# Patient Record
Sex: MALE | Race: OTHER | NOT HISPANIC OR LATINO | URBAN - METROPOLITAN AREA
[De-identification: names, ages, dates, MRNs, and addresses within clinical notes are randomized per-mention and may not be internally consistent; named-entity substitution may affect disease eponyms.]

---

## 2021-04-26 ENCOUNTER — EMERGENCY (EMERGENCY)
Facility: HOSPITAL | Age: 61
LOS: 1 days | Discharge: LEFT BEFORE DISCHARGE | End: 2021-04-26
Attending: EMERGENCY MEDICINE | Admitting: EMERGENCY MEDICINE
Payer: COMMERCIAL

## 2021-04-26 VITALS
HEART RATE: 90 BPM | TEMPERATURE: 98 F | SYSTOLIC BLOOD PRESSURE: 197 MMHG | RESPIRATION RATE: 16 BRPM | DIASTOLIC BLOOD PRESSURE: 119 MMHG | OXYGEN SATURATION: 100 %

## 2021-04-26 VITALS
HEART RATE: 86 BPM | DIASTOLIC BLOOD PRESSURE: 93 MMHG | RESPIRATION RATE: 18 BRPM | OXYGEN SATURATION: 100 % | SYSTOLIC BLOOD PRESSURE: 173 MMHG

## 2021-04-26 LAB
ALBUMIN SERPL ELPH-MCNC: 4.3 G/DL — SIGNIFICANT CHANGE UP (ref 3.3–5)
ALP SERPL-CCNC: 65 U/L — SIGNIFICANT CHANGE UP (ref 40–120)
ALT FLD-CCNC: 50 U/L — HIGH (ref 4–41)
ANION GAP SERPL CALC-SCNC: 11 MMOL/L — SIGNIFICANT CHANGE UP (ref 7–14)
AST SERPL-CCNC: 38 U/L — SIGNIFICANT CHANGE UP (ref 4–40)
BASOPHILS # BLD AUTO: 0.06 K/UL — SIGNIFICANT CHANGE UP (ref 0–0.2)
BASOPHILS NFR BLD AUTO: 0.6 % — SIGNIFICANT CHANGE UP (ref 0–2)
BILIRUB SERPL-MCNC: 0.5 MG/DL — SIGNIFICANT CHANGE UP (ref 0.2–1.2)
BLOOD GAS VENOUS COMPREHENSIVE RESULT: SIGNIFICANT CHANGE UP
BUN SERPL-MCNC: 14 MG/DL — SIGNIFICANT CHANGE UP (ref 7–23)
CALCIUM SERPL-MCNC: 9.6 MG/DL — SIGNIFICANT CHANGE UP (ref 8.4–10.5)
CHLORIDE SERPL-SCNC: 101 MMOL/L — SIGNIFICANT CHANGE UP (ref 98–107)
CO2 SERPL-SCNC: 24 MMOL/L — SIGNIFICANT CHANGE UP (ref 22–31)
CREAT SERPL-MCNC: 0.79 MG/DL — SIGNIFICANT CHANGE UP (ref 0.5–1.3)
EOSINOPHIL # BLD AUTO: 0.31 K/UL — SIGNIFICANT CHANGE UP (ref 0–0.5)
EOSINOPHIL NFR BLD AUTO: 3 % — SIGNIFICANT CHANGE UP (ref 0–6)
GLUCOSE SERPL-MCNC: 251 MG/DL — HIGH (ref 70–99)
HCT VFR BLD CALC: 44.6 % — SIGNIFICANT CHANGE UP (ref 39–50)
HGB BLD-MCNC: 14.9 G/DL — SIGNIFICANT CHANGE UP (ref 13–17)
IANC: 6.79 K/UL — SIGNIFICANT CHANGE UP (ref 1.5–8.5)
IMM GRANULOCYTES NFR BLD AUTO: 0.5 % — SIGNIFICANT CHANGE UP (ref 0–1.5)
LYMPHOCYTES # BLD AUTO: 2.35 K/UL — SIGNIFICANT CHANGE UP (ref 1–3.3)
LYMPHOCYTES # BLD AUTO: 23 % — SIGNIFICANT CHANGE UP (ref 13–44)
MAGNESIUM SERPL-MCNC: 2 MG/DL — SIGNIFICANT CHANGE UP (ref 1.6–2.6)
MCHC RBC-ENTMCNC: 29.9 PG — SIGNIFICANT CHANGE UP (ref 27–34)
MCHC RBC-ENTMCNC: 33.4 GM/DL — SIGNIFICANT CHANGE UP (ref 32–36)
MCV RBC AUTO: 89.6 FL — SIGNIFICANT CHANGE UP (ref 80–100)
MONOCYTES # BLD AUTO: 0.64 K/UL — SIGNIFICANT CHANGE UP (ref 0–0.9)
MONOCYTES NFR BLD AUTO: 6.3 % — SIGNIFICANT CHANGE UP (ref 2–14)
NEUTROPHILS # BLD AUTO: 6.79 K/UL — SIGNIFICANT CHANGE UP (ref 1.8–7.4)
NEUTROPHILS NFR BLD AUTO: 66.6 % — SIGNIFICANT CHANGE UP (ref 43–77)
NRBC # BLD: 0 /100 WBCS — SIGNIFICANT CHANGE UP
NRBC # FLD: 0 K/UL — SIGNIFICANT CHANGE UP
PLATELET # BLD AUTO: 245 K/UL — SIGNIFICANT CHANGE UP (ref 150–400)
POTASSIUM SERPL-MCNC: 4 MMOL/L — SIGNIFICANT CHANGE UP (ref 3.5–5.3)
POTASSIUM SERPL-SCNC: 4 MMOL/L — SIGNIFICANT CHANGE UP (ref 3.5–5.3)
PROT SERPL-MCNC: 7.9 G/DL — SIGNIFICANT CHANGE UP (ref 6–8.3)
RBC # BLD: 4.98 M/UL — SIGNIFICANT CHANGE UP (ref 4.2–5.8)
RBC # FLD: 12.2 % — SIGNIFICANT CHANGE UP (ref 10.3–14.5)
SODIUM SERPL-SCNC: 136 MMOL/L — SIGNIFICANT CHANGE UP (ref 135–145)
TROPONIN T, HIGH SENSITIVITY RESULT: 22 NG/L — SIGNIFICANT CHANGE UP
WBC # BLD: 10.2 K/UL — SIGNIFICANT CHANGE UP (ref 3.8–10.5)
WBC # FLD AUTO: 10.2 K/UL — SIGNIFICANT CHANGE UP (ref 3.8–10.5)

## 2021-04-26 PROCEDURE — 71046 X-RAY EXAM CHEST 2 VIEWS: CPT | Mod: 26

## 2021-04-26 PROCEDURE — 99285 EMERGENCY DEPT VISIT HI MDM: CPT

## 2021-04-26 RX ORDER — FAMOTIDINE 10 MG/ML
20 INJECTION INTRAVENOUS ONCE
Refills: 0 | Status: COMPLETED | OUTPATIENT
Start: 2021-04-26 | End: 2021-04-26

## 2021-04-26 RX ORDER — ASPIRIN/CALCIUM CARB/MAGNESIUM 324 MG
162 TABLET ORAL ONCE
Refills: 0 | Status: COMPLETED | OUTPATIENT
Start: 2021-04-26 | End: 2021-04-26

## 2021-04-26 RX ADMIN — Medication 30 MILLILITER(S): at 22:58

## 2021-04-26 RX ADMIN — FAMOTIDINE 20 MILLIGRAM(S): 10 INJECTION INTRAVENOUS at 22:58

## 2021-04-26 RX ADMIN — Medication 162 MILLIGRAM(S): at 22:58

## 2021-04-26 NOTE — ED ADULT TRIAGE NOTE - CHIEF COMPLAINT QUOTE
Patient c/o feeling palpitations and belching today at work and " mild left sided chest " discomfort". Patient has a h/o DM, CABG . hypertensive in triage, no h/o HTN.  FS = [id number]

## 2021-04-26 NOTE — ED PROVIDER NOTE - NS ED ROS FT
CONSTITUTIONAL: No fevers, no chills, no lightheadedness, no dizziness  EYES: no visual changes, no eye pain  EARS: no ear drainage, no ear pain, no change in hearing  NOSE: no nasal congestion  MOUTH/THROAT: no sore throat  CV: No chest pain  RESP: No SOB, no cough  GI: No n/v/d, no abd pain  : no dysuria, no hematuria, no flank pain  MSK: no back pain, no extremity pain  SKIN: no rashes  NEURO: no headache, no focal weakness, no decreased sensation/paresthesias

## 2021-04-26 NOTE — ED PROVIDER NOTE - OBJECTIVE STATEMENT
62yo M hx htn dm CABG in distant past, p/w intermittent episodes of palpitations/"heart beating" w/ belching that last 30 mins and come back 2 hours later, x 1 day. No hx of similar episodes. Denies exertional sx, cp, sob, abd pain, n/v/d. Admits has not had fu with cards or provocative testing in a long time.

## 2021-04-26 NOTE — ED PROVIDER NOTE - PATIENT PORTAL LINK FT
You can access the FollowMyHealth Patient Portal offered by [facility] by registering at the following website: [email or web address]. By joining [employer]’s FollowMyHealth portal, you will also be able to view your health information using other applications (apps) compatible with our system.

## 2021-04-26 NOTE — ED ADULT NURSE NOTE - OBJECTIVE STATEMENT
kang RN received pt to room 4 aox3 in no apparent distress VSS c/o palpitations and belching since 10 am. Pt states episodes are intermittent blasting 30 m in. Denies any aggravating or alleviating factors. Pt denies chest pain, weakness, SOB, n/v. Pt hx CABG states this does not feels similar to then and denies any symptoms at all at this time. Noted with glucose monitor to L arm. 18 G PIV R AC placed, NSR on cardiac monitor,  endorsed to primary RN

## 2021-04-26 NOTE — ED PROVIDER NOTE - NSFOLLOWUPINSTRUCTIONS_ED_ALL_ED_FT
Follow up with your cardiologist in 2-3 days for STRESS test and holter monitor, or call [phone] and arrange a follow up with our clinic, state you were seen in the emergency department and would like a rapid appointment.  - Return to the Emergency Room for any new or worsening symptoms.  - Please take Tylenol 650mg every 6 hours as needed for pain.

## 2021-04-26 NOTE — ED PROVIDER NOTE - CLINICAL SUMMARY MEDICAL DECISION MAKING FREE TEXT BOX
Intermittent palpitations/belching concern of acs vs arrhythmia given poor fu and significant cardiac hx. Labs, ekg, and likely CDU v admit.

## 2021-04-26 NOTE — ED PROVIDER NOTE - PROGRESS NOTE DETAILS
Dr Hanson: Pt trop delta negative, pt refusing admission for tele monitoring, says he will see his doctor today or tomorrow. 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

## 2021-04-27 LAB
SARS-COV-2 RNA SPEC QL NAA+PROBE: SIGNIFICANT CHANGE UP
TROPONIN T, HIGH SENSITIVITY RESULT: 23 NG/L — SIGNIFICANT CHANGE UP

## 2021-10-15 ENCOUNTER — EMERGENCY (EMERGENCY)
Facility: HOSPITAL | Age: 61
LOS: 1 days | Discharge: HOME | End: 2021-10-15
Attending: EMERGENCY MEDICINE | Admitting: HOSPITALIST
Payer: COMMERCIAL

## 2021-10-15 VITALS
HEART RATE: 69 BPM | RESPIRATION RATE: 16 BRPM | TEMPERATURE: 98 F | DIASTOLIC BLOOD PRESSURE: 91 MMHG | OXYGEN SATURATION: 99 % | SYSTOLIC BLOOD PRESSURE: 172 MMHG

## 2021-10-15 VITALS
RESPIRATION RATE: 16 BRPM | SYSTOLIC BLOOD PRESSURE: 135 MMHG | HEART RATE: 69 BPM | TEMPERATURE: 98 F | OXYGEN SATURATION: 100 % | DIASTOLIC BLOOD PRESSURE: 92 MMHG

## 2021-10-15 LAB
ALBUMIN SERPL ELPH-MCNC: 4.6 G/DL — SIGNIFICANT CHANGE UP (ref 3.3–5)
ALP SERPL-CCNC: 76 U/L — SIGNIFICANT CHANGE UP (ref 40–120)
ALT FLD-CCNC: 52 U/L — HIGH (ref 4–41)
ANION GAP SERPL CALC-SCNC: 11 MMOL/L — SIGNIFICANT CHANGE UP (ref 7–14)
APTT BLD: 31.9 SEC — SIGNIFICANT CHANGE UP (ref 27–36.3)
AST SERPL-CCNC: 29 U/L — SIGNIFICANT CHANGE UP (ref 4–40)
B PERT DNA SPEC QL NAA+PROBE: SIGNIFICANT CHANGE UP
B PERT+PARAPERT DNA PNL SPEC NAA+PROBE: SIGNIFICANT CHANGE UP
BASOPHILS # BLD AUTO: 0.04 K/UL — SIGNIFICANT CHANGE UP (ref 0–0.2)
BASOPHILS NFR BLD AUTO: 0.4 % — SIGNIFICANT CHANGE UP (ref 0–2)
BILIRUB SERPL-MCNC: 0.6 MG/DL — SIGNIFICANT CHANGE UP (ref 0.2–1.2)
BORDETELLA PARAPERTUSSIS (RAPRVP): SIGNIFICANT CHANGE UP
BUN SERPL-MCNC: 11 MG/DL — SIGNIFICANT CHANGE UP (ref 7–23)
C PNEUM DNA SPEC QL NAA+PROBE: SIGNIFICANT CHANGE UP
CALCIUM SERPL-MCNC: 9.8 MG/DL — SIGNIFICANT CHANGE UP (ref 8.4–10.5)
CHLORIDE SERPL-SCNC: 100 MMOL/L — SIGNIFICANT CHANGE UP (ref 98–107)
CO2 SERPL-SCNC: 25 MMOL/L — SIGNIFICANT CHANGE UP (ref 22–31)
CREAT SERPL-MCNC: 0.8 MG/DL — SIGNIFICANT CHANGE UP (ref 0.5–1.3)
EOSINOPHIL # BLD AUTO: 0.18 K/UL — SIGNIFICANT CHANGE UP (ref 0–0.5)
EOSINOPHIL NFR BLD AUTO: 1.7 % — SIGNIFICANT CHANGE UP (ref 0–6)
FLUAV SUBTYP SPEC NAA+PROBE: SIGNIFICANT CHANGE UP
FLUBV RNA SPEC QL NAA+PROBE: SIGNIFICANT CHANGE UP
GLUCOSE SERPL-MCNC: 193 MG/DL — HIGH (ref 70–99)
HADV DNA SPEC QL NAA+PROBE: SIGNIFICANT CHANGE UP
HCOV 229E RNA SPEC QL NAA+PROBE: SIGNIFICANT CHANGE UP
HCOV HKU1 RNA SPEC QL NAA+PROBE: SIGNIFICANT CHANGE UP
HCOV NL63 RNA SPEC QL NAA+PROBE: SIGNIFICANT CHANGE UP
HCOV OC43 RNA SPEC QL NAA+PROBE: SIGNIFICANT CHANGE UP
HCT VFR BLD CALC: 46.9 % — SIGNIFICANT CHANGE UP (ref 39–50)
HGB BLD-MCNC: 15.7 G/DL — SIGNIFICANT CHANGE UP (ref 13–17)
HMPV RNA SPEC QL NAA+PROBE: SIGNIFICANT CHANGE UP
HPIV1 RNA SPEC QL NAA+PROBE: SIGNIFICANT CHANGE UP
HPIV2 RNA SPEC QL NAA+PROBE: SIGNIFICANT CHANGE UP
HPIV3 RNA SPEC QL NAA+PROBE: SIGNIFICANT CHANGE UP
HPIV4 RNA SPEC QL NAA+PROBE: SIGNIFICANT CHANGE UP
IANC: 7.45 K/UL — SIGNIFICANT CHANGE UP (ref 1.5–8.5)
IMM GRANULOCYTES NFR BLD AUTO: 0.4 % — SIGNIFICANT CHANGE UP (ref 0–1.5)
INR BLD: 1.1 RATIO — SIGNIFICANT CHANGE UP (ref 0.88–1.16)
LYMPHOCYTES # BLD AUTO: 2.53 K/UL — SIGNIFICANT CHANGE UP (ref 1–3.3)
LYMPHOCYTES # BLD AUTO: 23.4 % — SIGNIFICANT CHANGE UP (ref 13–44)
M PNEUMO DNA SPEC QL NAA+PROBE: SIGNIFICANT CHANGE UP
MCHC RBC-ENTMCNC: 30 PG — SIGNIFICANT CHANGE UP (ref 27–34)
MCHC RBC-ENTMCNC: 33.5 GM/DL — SIGNIFICANT CHANGE UP (ref 32–36)
MCV RBC AUTO: 89.7 FL — SIGNIFICANT CHANGE UP (ref 80–100)
MONOCYTES # BLD AUTO: 0.57 K/UL — SIGNIFICANT CHANGE UP (ref 0–0.9)
MONOCYTES NFR BLD AUTO: 5.3 % — SIGNIFICANT CHANGE UP (ref 2–14)
NEUTROPHILS # BLD AUTO: 7.45 K/UL — HIGH (ref 1.8–7.4)
NEUTROPHILS NFR BLD AUTO: 68.8 % — SIGNIFICANT CHANGE UP (ref 43–77)
NRBC # BLD: 0 /100 WBCS — SIGNIFICANT CHANGE UP
NRBC # FLD: 0 K/UL — SIGNIFICANT CHANGE UP
PLATELET # BLD AUTO: 276 K/UL — SIGNIFICANT CHANGE UP (ref 150–400)
POTASSIUM SERPL-MCNC: 5.1 MMOL/L — SIGNIFICANT CHANGE UP (ref 3.5–5.3)
POTASSIUM SERPL-SCNC: 5.1 MMOL/L — SIGNIFICANT CHANGE UP (ref 3.5–5.3)
PROT SERPL-MCNC: 8.5 G/DL — HIGH (ref 6–8.3)
PROTHROM AB SERPL-ACNC: 12.6 SEC — SIGNIFICANT CHANGE UP (ref 10.6–13.6)
RAPID RVP RESULT: SIGNIFICANT CHANGE UP
RBC # BLD: 5.23 M/UL — SIGNIFICANT CHANGE UP (ref 4.2–5.8)
RBC # FLD: 11.9 % — SIGNIFICANT CHANGE UP (ref 10.3–14.5)
RSV RNA SPEC QL NAA+PROBE: SIGNIFICANT CHANGE UP
RV+EV RNA SPEC QL NAA+PROBE: SIGNIFICANT CHANGE UP
SARS-COV-2 RNA SPEC QL NAA+PROBE: SIGNIFICANT CHANGE UP
SODIUM SERPL-SCNC: 136 MMOL/L — SIGNIFICANT CHANGE UP (ref 135–145)
TROPONIN T, HIGH SENSITIVITY RESULT: 24 NG/L — SIGNIFICANT CHANGE UP
TROPONIN T, HIGH SENSITIVITY RESULT: 25 NG/L — SIGNIFICANT CHANGE UP
WBC # BLD: 10.81 K/UL — HIGH (ref 3.8–10.5)
WBC # FLD AUTO: 10.81 K/UL — HIGH (ref 3.8–10.5)

## 2021-10-15 PROCEDURE — 93010 ELECTROCARDIOGRAM REPORT: CPT

## 2021-10-15 PROCEDURE — 70450 CT HEAD/BRAIN W/O DYE: CPT | Mod: 26

## 2021-10-15 PROCEDURE — 99285 EMERGENCY DEPT VISIT HI MDM: CPT | Mod: 25

## 2021-10-15 PROCEDURE — 71045 X-RAY EXAM CHEST 1 VIEW: CPT | Mod: 26

## 2021-10-15 RX ORDER — MECLIZINE HCL 12.5 MG
25 TABLET ORAL ONCE
Refills: 0 | Status: COMPLETED | OUTPATIENT
Start: 2021-10-15 | End: 2021-10-15

## 2021-10-15 RX ADMIN — Medication 25 MILLIGRAM(S): at 16:32

## 2021-10-15 NOTE — ED PROVIDER NOTE - PATIENT PORTAL LINK FT
You can access the FollowMyHealth Patient Portal offered by [facility] by registering at the following website: [email or web address]. By joining [email or web address]’s FollowMyHealth portal, you will also be able to view your health information using other applications (apps) compatible with our system.

## 2021-10-15 NOTE — ED PROVIDER NOTE - PROGRESS NOTE DETAILS
Lalo, PGY3: pt with dizziness that's intermittent for the past week worsening over past 3 days, pt denies any association with exertion, denies any chest pain/sob, fevers/chills, uri sxs, cough, abd pain, n/v/d, dark/bloody stools, urinary sxs, orthopnea/leg swelling. Pt states that he is mostly worried because he feels the sensation of when he has a PVC. States the dizziness sensation comes at random. Pts trop stable here, ekg not ischemic, no events on monitor while here for 3 hrs, vitals reassuring. Pt states he has PCP appointment for tomorrow already set and that he has a Cardiologist appointment set up in a week. Pt states that if his labs/ekg/cxr/ct head aren't abnormal that he'd prefer to go home. Explained to pt return precautions and stressed that he should definitely follow up with his doctor appointments. Joint decision making made and pt ok for discharge.

## 2021-10-15 NOTE — ED PROVIDER NOTE - CLINICAL SUMMARY MEDICAL DECISION MAKING FREE TEXT BOX
63 y/o male with PMHx of CAD s/p CABG, HTN, and DM type 2 who presented to the ED for dizziness X 1 wk.  Concern for ACS/Vertigo/Arrhythmia/UTI/Metabolic. Less suspicious for a central neurological cause.   Labs, EKG, CXR, UA 61 y/o male with PMHx of CAD s/p CABG, HTN, and DM type 2 who presented to the ED for dizziness X 1 wk.  Concern for ACS/Vertigo/Arrhythmia/UTI/Metabolic. Less suspicious for a central neurological cause.   Labs, EKG, CXR, CT, UA

## 2021-10-15 NOTE — ED PROVIDER NOTE - OBJECTIVE STATEMENT
61 y/o male with PMHx of CAD s/p CABG, HTN, and DM type 2 who presented to the ED for dizziness X 1 wk. 63 y/o male with PMHx of CAD s/p CABG, HTN, and DM type 2 who presented to the ED for dizziness intermittent X 1 wk. Pt states over the past week having episodes of dizziness worse over the past 3 days. Pt notes symptoms worse with change in position. Pt denies any headache, fever, chills, nausea, vomiting, SOB, chest pain, or abd pain. Pt admits to episodes of palpitations.

## 2021-10-15 NOTE — ED PROVIDER NOTE - NSFOLLOWUPINSTRUCTIONS_ED_ALL_ED_FT
- Please follow up with your Primary Care Doctor tomorrow in the office. Bring your results from today.    - Please follow up with your Cardiologist at your upcoming appointment. Bring your results from today.      - Be sure to return to the ED if you develop new, worsening, or any distressing symptoms.

## 2021-10-15 NOTE — ED PROVIDER NOTE - NEUROLOGICAL, MLM
Alert and oriented, no focal deficits, no motor or sensory deficits. Good finger to nose. No nystagmus.

## 2021-10-15 NOTE — ED ADULT TRIAGE NOTE - CHIEF COMPLAINT QUOTE
Pt presents to ED for dizziness x1wk, worse last couple of days. Pt also high blood pressure of 180/100 x2days ago. Pt states "Sometimes I feel like I am going to pass out." Pt also c/o tingling to b/l legs x2-3months.  Pmhx of quadruple bypass 2004 on plavix, aspirin, HTN, DM.

## 2021-10-15 NOTE — ED PROVIDER NOTE - ATTENDING CONTRIBUTION TO CARE
Pt was seen and evaluated by me. Pt is a 63 y/o male with PMHx of CAD s/p CABG, HTN, and DM type 2 who presented to the ED for dizziness X 1 wk. Pt states over the past week having dizziness       Concern for ACS/Vertigo/Arrhythmia/UTI/Metabolic  Labs, EKG, CXR, UA Pt was seen and evaluated by me. Pt is a 63 y/o male with PMHx of CAD s/p CABG, HTN, and DM type 2 who presented to the ED for dizziness intermittent X 1 wk. Pt states over the past week having episodes of dizziness worse over the past 3 days. Pt notes symptoms worse with change in position. Pt denies any headache, fever, chills, nausea, vomiting, SOB, chest pain, or abd pain. Pt admits to episodes of palpitations. Lungs CTA b/l. RRR. Abd soft, non-tender. No focal deficits. Good finger to nose. No nystagmus. 5/5/ b/l UE and LE.   Concern for ACS/Vertigo/Arrhythmia/UTI/Metabolic  Labs, EKG, CXR, CT, UA

## 2021-10-15 NOTE — ED ADULT NURSE NOTE - OBJECTIVE STATEMENT
kang rn: pt received to room #2 with c/o dizziness x 1 week and "feeling like I'm missing a beat" pt on HM, NSR. rr even and unlabored, sounds congested when speaking, states "I woke up with my voice hoarse" denies cp, sob, n/v/d. pt aox4. IV placed, labs drawn and sent, pending MD hansen, report given to break cover CARLOS OBREGON

## 2021-11-23 VITALS
HEART RATE: 56 BPM | SYSTOLIC BLOOD PRESSURE: 138 MMHG | DIASTOLIC BLOOD PRESSURE: 68 MMHG | TEMPERATURE: 98 F | RESPIRATION RATE: 16 BRPM | OXYGEN SATURATION: 99 %

## 2021-11-23 PROBLEM — Z86.79 PERSONAL HISTORY OF OTHER DISEASES OF THE CIRCULATORY SYSTEM: Chronic | Status: ACTIVE | Noted: 2021-10-15

## 2021-11-23 RX ORDER — CHLORHEXIDINE GLUCONATE 213 G/1000ML
1 SOLUTION TOPICAL ONCE
Refills: 0 | Status: DISCONTINUED | OUTPATIENT
Start: 2021-11-30 | End: 2021-11-30

## 2021-11-23 NOTE — H&P ADULT - NSICDXPASTMEDICALHX_GEN_ALL_CORE_FT
PAST MEDICAL HISTORY:  H/O: HTN (hypertension)     Hyperlipidemia      PAST MEDICAL HISTORY:  DM (diabetes mellitus)     H/O: HTN (hypertension)     Hyperlipidemia      PAST MEDICAL HISTORY:  CAD (coronary artery disease)     DM (diabetes mellitus)     H/O: HTN (hypertension)     Hyperlipidemia

## 2021-11-23 NOTE — H&P ADULT - RS GEN PE MLT RESP DETAILS PC
normal/airway patent/breath sounds equal/good air movement/respirations non-labored/no rales/no rhonchi/no wheezes

## 2021-11-23 NOTE — H&P ADULT - HISTORY OF PRESENT ILLNESS
SKELETON    Covid:  Cardiologist: Dr. Leroy  Pharmacy:  Escort:    60 yo M with PMHx HTN, DM2, CAD s/p CABG who presented to Cardiologist Dr. Leroy c/o intermittent dizziness worsened with change in position.     Denies CP, SOB, diaphoresis, palpitations, fatigue, LE edema, orthopnea, PND, syncope, N/V, abdominal pain, f/c, sick contact, recent travel.    In light of pt's risk factors, CCS Anginal Class ___ Sx, abnormal ___, pt referred for cardiac cath with possible intervention to r/o underlying CAD.    COVID:  Pharmacy:  Escort:    61Y M w/ FHx of CAD and PMHx of HTN, HLD, CAD s/p CABG, DM-II, and CHF (EF unknown), initially presented to outpt cardiologist Dr. Leroy c/o generalized fatigue, SOB and CP. He denies any ___ palpitations,  dizziness/lightheadedness, syncope, diaphoresis, LE edema, orthopnea, PND, N/V, fever, chills or recent sick contact. Per MD note, Echo revealed no LV thrombus and Holter revealed no evidence of malignant tachy or riana arrythmias.  In light of pts risk factors, CCS class __ anginal symptoms and abnormal ____, pt now presents to [facility] for recommended cardiac catheterization with possible intervention if clinically indicated.  COVID: negative 11/27/21 (pt bringing in results)  Pharmacy: [facility] ([address] [facility])  Escort: Wife    61Y M w/ FHx of CAD and PMHx of HTN, HLD, CAD w/ prior MI, s/p CABG (2004), DM-II, and CHF (EF 25%, per pt), initially presented to outpt cardiologist Dr. Coleman c/o intermittent palpitations since April, independent of exertion and resolves on its own. He denies any CP, dizziness/lightheadedness, syncope, LE edema, GUSMAN, orthopnea, PND, N/V, fever, chills or recent sick contact. Per MD note, Echo revealed no LV thrombus and Holter Monitor revealed no evidence of malignant tachy or riana arrythmia's.  In light of pts risk factors, CCS class III anginal symptoms and newly reduced LVEF, pt now presents to [facility] for recommended cardiac catheterization with possible intervention if clinically indicated to r/o ICMP.  COVID: negative 11/27/21 (pt bringing in results)  Pharmacy: [facility] ([address] [facility])  Escort: Wife    61Y M w/ FHx of CAD and PMHx of HTN, HLD, CAD w/ prior MI, s/p CABG (2004), DM-II, and CHF (EF 25%, per pt), initially presented to outpt cardiologist Dr. Coleman c/o intermittent palpitations since April, independent of exertion and resolves on its own. Pt endorses occasional dizziness but denies any lightheadedness or syncope. He also denies any CP, LE edema, GUSMAN, orthopnea, PND, N/V, fever, chills or recent sick contact. Per MD note, Echo revealed no LV thrombus and Holter Monitor revealed no evidence of malignant tachy or riana arrythmia's.  In light of pts risk factors, CCS class III anginal symptoms and newly reduced LVEF, pt now presents to [facility] for recommended cardiac catheterization with possible intervention if clinically indicated to r/o ICMP.  COVID: negative 11/27/21 (pt bringing in results)  Pharmacy: [facility] ([address] [facility])  Escort: Wife    61Y M w/ FHx of CAD and PMHx of HTN, HLD, CAD w/ prior MI, s/p CABG (2004), DM-II, and CHF (EF 25% per pt, PENDING FAX FROM [facility] OFFICE), initially presented to outpt cardiologist Dr. Coleman c/o intermittent palpitations since April, independent of exertion and resolves on its own. Pt endorses occasional dizziness but denies any lightheadedness or syncope. He also denies any CP, LE edema, GUSMAN, orthopnea, PND, N/V, fever, chills or recent sick contact. Per MD note, Echo revealed no LV thrombus and Holter Monitor revealed no evidence of malignant tachy or riana arrythmia's.  In light of pts risk factors, CCS class III anginal symptoms and newly reduced LVEF, pt now presents to [facility] for recommended cardiac catheterization with possible intervention if clinically indicated to r/o ICMP.  COVID: negative 11/27/21 (pt bringing in results)  Pharmacy: [facility] ([address] [facility])  Escort: Wife    61Y M w/ FHx of CAD and PMHx of HTN, HLD, CAD w/ prior MI, s/p CABG (2004), DM-II, and CHF (EF 25% per pt) who initially presented to outpt cardiologist Dr. Coleman c/o intermittent palpitations since April, independent of exertion and resolves on its own. Pt endorses occasional dizziness but denies any lightheadedness or syncope. Additonally endorses LLE pain on ambulation for which he saw vascular Dr. Lentz and tentatively planned for peripheral angiogram with cardiac cath. He also denies any CP, LE edema, GUSMAN, orthopnea, PND, N/V, fever, chills or recent sick contact. Per MD note, Echo revealed no LV thrombus and Holter Monitor revealed no evidence of malignant tachy or riana arrythmia's.  In light of pts risk factors, CCS class III anginal symptoms and newly reduced LVEF, pt now presents to [facility] for recommended cardiac catheterization with possible intervention if clinically indicated to r/o ICMP.

## 2021-11-23 NOTE — H&P ADULT - ADDITIONAL PE
ASA III, Mallampati III  ECG: ASA III, Mallampati III  ECG: SB @ 56bpm with Q waves in II, III, aVF, V2-V5, TWI aVL

## 2021-11-23 NOTE — H&P ADULT - NSICDXPASTSURGICALHX_GEN_ALL_CORE_FT
PAST SURGICAL HISTORY:  S/P CABG (coronary artery bypass graft)      PAST SURGICAL HISTORY:  S/P CABG (coronary artery bypass graft) 2004

## 2021-11-23 NOTE — H&P ADULT - ASSESSMENT
61Y M w/ FHx of CAD and PMHx of HTN, HLD, CAD w/ prior MI, s/p CABG (2004), DM-II, and CHF (EF 25% per pt, PENDING FAX FROM [facility] OFFICE), initially presented to outpt cardiologist Dr. Coleman c/o intermittent palpitations since April, independent of exertion and resolves on its own. In light of pts risk factors, CCS class III anginal symptoms and newly reduced LVEF, pt now presents to [facility] for recommended cardiac catheterization with possible intervention if clinically indicated to r/o ICMP.     -H/H = ____. Pt denies BRBPR, hematuria, hematochezia, melena. Pt loaded 325mg ASA x1 and Plavix 600mg x1  -Cr = _____. EF normal. Euvolemic on exam. IVF @ 75cc/hr started pre procedure  -Type of sedation: moderate  -Candidate for sedation: yes     Risks & benefits of procedure and alternative therapy have been explained to the patient including but not limited to: allergic reaction, bleeding w/possible need for blood transfusion, infection, renal and vascular compromise, limb damage, arrhythmia, stroke, vessel dissection/perforation, Myocardial infarction, emergent CABG. Informed consent obtained and in chart.    61Y M w/ FHx of CAD and PMHx of HTN, HLD, CAD w/ prior MI, s/p CABG (2004), DM-II, and CHF (EF 25% per pt) who initially presented to outpt cardiologist Dr. Coleman c/o intermittent palpitations since April, independent of exertion and resolves on its own. Additionally endorses LLE pain on ambulation for which he saw vascular Dr. Lentz and tentatively planned for peripheral angiogram with cardiac cath. In light of pts risk factors, CCS class III anginal symptoms and newly reduced LVEF, pt now presents to [facility] for recommended cardiac catheterization and peripheral angiogram with possible intervention if clinically indicated to r/o ICMP.     -H/H = 16/51. Pt denies BRBPR, hematuria, hematochezia, melena. Compliant with home ASA/Plavix however hasn't taken in 2 days as he thought he had to hold for procedure. Will load with 325mg ASA x1 and Plavix 600mg x1 pre cath as discussed with Dr. Leroy  -Cr = _____. EF 25%. Euvolemic on exam. IVF @ 30cc/hr w/ frequent lung checks started pre procedure  -Type of sedation: moderate  -Candidate for sedation: yes     Risks & benefits of procedure and alternative therapy have been explained to the patient including but not limited to: allergic reaction, bleeding w/possible need for blood transfusion, infection, renal and vascular compromise, limb damage, arrhythmia, stroke, vessel dissection/perforation, Myocardial infarction, emergent CABG. Informed consent obtained and in chart.     Risks & benefits of procedure and alternative therapy have been explained to the patient including but not limited to: allergic reaction, bleeding w/possible need for blood transfusion, infection, renal and vascular compromise, limb damage, stroke, Myocardial infarction, vessel dissection/perforation, emergent Vascular Surgery. Informed consent obtained and in chart.

## 2021-11-23 NOTE — H&P ADULT - NSICDXFAMILYHX_GEN_ALL_CORE_FT
FAMILY HISTORY:  No pertinent family history in first degree relatives     FAMILY HISTORY:  Sibling  Still living? Unknown  FH: CAD (coronary artery disease), Age at diagnosis: Age Unknown

## 2021-11-29 RX ORDER — RAMIPRIL 5 MG
1 CAPSULE ORAL
Qty: 0 | Refills: 0 | DISCHARGE

## 2021-11-30 ENCOUNTER — INPATIENT (INPATIENT)
Facility: HOSPITAL | Age: 61
LOS: 0 days | Discharge: HOME | DRG: 247 | End: 2021-12-01
Attending: INTERNAL MEDICINE | Admitting: INTERNAL MEDICINE
Payer: COMMERCIAL

## 2021-11-30 DIAGNOSIS — Z95.1 PRESENCE OF AORTOCORONARY BYPASS GRAFT: Chronic | ICD-10-CM

## 2021-11-30 LAB
A1C WITH ESTIMATED AVERAGE GLUCOSE RESULT: 8.4 % — HIGH (ref 4–5.6)
ALBUMIN SERPL ELPH-MCNC: 4.7 G/DL — SIGNIFICANT CHANGE UP (ref 3.3–5)
ALP SERPL-CCNC: 68 U/L — SIGNIFICANT CHANGE UP (ref 40–120)
ALT FLD-CCNC: 33 U/L — SIGNIFICANT CHANGE UP (ref 10–45)
ANION GAP SERPL CALC-SCNC: 12 MMOL/L — SIGNIFICANT CHANGE UP (ref 5–17)
APTT BLD: 33.2 SEC — SIGNIFICANT CHANGE UP (ref 27.5–35.5)
AST SERPL-CCNC: 29 U/L — SIGNIFICANT CHANGE UP (ref 10–40)
BASOPHILS # BLD AUTO: 0.04 K/UL — SIGNIFICANT CHANGE UP (ref 0–0.2)
BASOPHILS NFR BLD AUTO: 0.4 % — SIGNIFICANT CHANGE UP (ref 0–2)
BILIRUB SERPL-MCNC: 0.6 MG/DL — SIGNIFICANT CHANGE UP (ref 0.2–1.2)
BUN SERPL-MCNC: 13 MG/DL — SIGNIFICANT CHANGE UP (ref 7–23)
CALCIUM SERPL-MCNC: 10.2 MG/DL — SIGNIFICANT CHANGE UP (ref 8.4–10.5)
CHLORIDE SERPL-SCNC: 101 MMOL/L — SIGNIFICANT CHANGE UP (ref 96–108)
CHOLEST SERPL-MCNC: 231 MG/DL — HIGH
CK MB CFR SERPL CALC: 4.5 NG/ML — SIGNIFICANT CHANGE UP (ref 0–6.7)
CK SERPL-CCNC: 182 U/L — SIGNIFICANT CHANGE UP (ref 30–200)
CO2 SERPL-SCNC: 25 MMOL/L — SIGNIFICANT CHANGE UP (ref 22–31)
CREAT SERPL-MCNC: 0.82 MG/DL — SIGNIFICANT CHANGE UP (ref 0.5–1.3)
EOSINOPHIL # BLD AUTO: 0.17 K/UL — SIGNIFICANT CHANGE UP (ref 0–0.5)
EOSINOPHIL NFR BLD AUTO: 1.8 % — SIGNIFICANT CHANGE UP (ref 0–6)
ESTIMATED AVERAGE GLUCOSE: 194 MG/DL — HIGH (ref 68–114)
GLUCOSE BLDC GLUCOMTR-MCNC: 109 MG/DL — HIGH (ref 70–99)
GLUCOSE BLDC GLUCOMTR-MCNC: 110 MG/DL — HIGH (ref 70–99)
GLUCOSE BLDC GLUCOMTR-MCNC: 124 MG/DL — HIGH (ref 70–99)
GLUCOSE SERPL-MCNC: 215 MG/DL — HIGH (ref 70–99)
HCT VFR BLD CALC: 51 % — HIGH (ref 39–50)
HDLC SERPL-MCNC: 42 MG/DL — SIGNIFICANT CHANGE UP
HGB BLD-MCNC: 16 G/DL — SIGNIFICANT CHANGE UP (ref 13–17)
IMM GRANULOCYTES NFR BLD AUTO: 0.3 % — SIGNIFICANT CHANGE UP (ref 0–1.5)
INR BLD: 1.1 — SIGNIFICANT CHANGE UP (ref 0.88–1.16)
LIPID PNL WITH DIRECT LDL SERPL: 168 MG/DL — HIGH
LYMPHOCYTES # BLD AUTO: 2.03 K/UL — SIGNIFICANT CHANGE UP (ref 1–3.3)
LYMPHOCYTES # BLD AUTO: 21.4 % — SIGNIFICANT CHANGE UP (ref 13–44)
MCHC RBC-ENTMCNC: 29.4 PG — SIGNIFICANT CHANGE UP (ref 27–34)
MCHC RBC-ENTMCNC: 31.4 GM/DL — LOW (ref 32–36)
MCV RBC AUTO: 93.8 FL — SIGNIFICANT CHANGE UP (ref 80–100)
MONOCYTES # BLD AUTO: 0.48 K/UL — SIGNIFICANT CHANGE UP (ref 0–0.9)
MONOCYTES NFR BLD AUTO: 5.1 % — SIGNIFICANT CHANGE UP (ref 2–14)
NEUTROPHILS # BLD AUTO: 6.74 K/UL — SIGNIFICANT CHANGE UP (ref 1.8–7.4)
NEUTROPHILS NFR BLD AUTO: 71 % — SIGNIFICANT CHANGE UP (ref 43–77)
NON HDL CHOLESTEROL: 189 MG/DL — HIGH
NRBC # BLD: 0 /100 WBCS — SIGNIFICANT CHANGE UP (ref 0–0)
PLATELET # BLD AUTO: 289 K/UL — SIGNIFICANT CHANGE UP (ref 150–400)
POTASSIUM SERPL-MCNC: 4.5 MMOL/L — SIGNIFICANT CHANGE UP (ref 3.5–5.3)
POTASSIUM SERPL-SCNC: 4.5 MMOL/L — SIGNIFICANT CHANGE UP (ref 3.5–5.3)
PROT SERPL-MCNC: 8.9 G/DL — HIGH (ref 6–8.3)
PROTHROM AB SERPL-ACNC: 13.1 SEC — SIGNIFICANT CHANGE UP (ref 10.6–13.6)
RBC # BLD: 5.44 M/UL — SIGNIFICANT CHANGE UP (ref 4.2–5.8)
RBC # FLD: 12.5 % — SIGNIFICANT CHANGE UP (ref 10.3–14.5)
SODIUM SERPL-SCNC: 138 MMOL/L — SIGNIFICANT CHANGE UP (ref 135–145)
TRIGL SERPL-MCNC: 106 MG/DL — SIGNIFICANT CHANGE UP
WBC # BLD: 9.49 K/UL — SIGNIFICANT CHANGE UP (ref 3.8–10.5)
WBC # FLD AUTO: 9.49 K/UL — SIGNIFICANT CHANGE UP (ref 3.8–10.5)

## 2021-11-30 PROCEDURE — 75716 ARTERY X-RAYS ARMS/LEGS: CPT | Mod: 26,59

## 2021-11-30 PROCEDURE — 92928 PRQ TCAT PLMT NTRAC ST 1 LES: CPT | Mod: LD

## 2021-11-30 PROCEDURE — 93459 L HRT ART/GRFT ANGIO: CPT | Mod: 26,59

## 2021-11-30 RX ORDER — CLOPIDOGREL BISULFATE 75 MG/1
75 TABLET, FILM COATED ORAL DAILY
Refills: 0 | Status: DISCONTINUED | OUTPATIENT
Start: 2021-12-01 | End: 2021-12-01

## 2021-11-30 RX ORDER — GLUCAGON INJECTION, SOLUTION 0.5 MG/.1ML
1 INJECTION, SOLUTION SUBCUTANEOUS ONCE
Refills: 0 | Status: DISCONTINUED | OUTPATIENT
Start: 2021-11-30 | End: 2021-12-01

## 2021-11-30 RX ORDER — METOPROLOL TARTRATE 50 MG
25 TABLET ORAL DAILY
Refills: 0 | Status: DISCONTINUED | OUTPATIENT
Start: 2021-11-30 | End: 2021-11-30

## 2021-11-30 RX ORDER — SODIUM CHLORIDE 9 MG/ML
500 INJECTION INTRAMUSCULAR; INTRAVENOUS; SUBCUTANEOUS
Refills: 0 | Status: DISCONTINUED | OUTPATIENT
Start: 2021-11-30 | End: 2021-11-30

## 2021-11-30 RX ORDER — ASPIRIN/CALCIUM CARB/MAGNESIUM 324 MG
325 TABLET ORAL ONCE
Refills: 0 | Status: COMPLETED | OUTPATIENT
Start: 2021-11-30 | End: 2021-11-30

## 2021-11-30 RX ORDER — DEXTROSE 50 % IN WATER 50 %
25 SYRINGE (ML) INTRAVENOUS ONCE
Refills: 0 | Status: DISCONTINUED | OUTPATIENT
Start: 2021-11-30 | End: 2021-12-01

## 2021-11-30 RX ORDER — SODIUM CHLORIDE 9 MG/ML
1000 INJECTION, SOLUTION INTRAVENOUS
Refills: 0 | Status: DISCONTINUED | OUTPATIENT
Start: 2021-11-30 | End: 2021-12-01

## 2021-11-30 RX ORDER — INSULIN LISPRO 100/ML
VIAL (ML) SUBCUTANEOUS
Refills: 0 | Status: DISCONTINUED | OUTPATIENT
Start: 2021-11-30 | End: 2021-12-01

## 2021-11-30 RX ORDER — CLOPIDOGREL BISULFATE 75 MG/1
600 TABLET, FILM COATED ORAL ONCE
Refills: 0 | Status: COMPLETED | OUTPATIENT
Start: 2021-11-30 | End: 2021-11-30

## 2021-11-30 RX ORDER — GLYBURIDE-METFORMIN HYDROCHLORIDE 1.25; 25 MG/1; MG/1
1 TABLET ORAL
Qty: 0 | Refills: 0 | DISCHARGE

## 2021-11-30 RX ORDER — ASPIRIN/CALCIUM CARB/MAGNESIUM 324 MG
81 TABLET ORAL DAILY
Refills: 0 | Status: DISCONTINUED | OUTPATIENT
Start: 2021-11-30 | End: 2021-12-01

## 2021-11-30 RX ORDER — SACUBITRIL AND VALSARTAN 24; 26 MG/1; MG/1
1 TABLET, FILM COATED ORAL
Refills: 0 | Status: DISCONTINUED | OUTPATIENT
Start: 2021-12-01 | End: 2021-12-01

## 2021-11-30 RX ORDER — DEXTROSE 50 % IN WATER 50 %
15 SYRINGE (ML) INTRAVENOUS ONCE
Refills: 0 | Status: DISCONTINUED | OUTPATIENT
Start: 2021-11-30 | End: 2021-12-01

## 2021-11-30 RX ORDER — SITAGLIPTIN 50 MG/1
1 TABLET, FILM COATED ORAL
Qty: 0 | Refills: 0 | DISCHARGE

## 2021-11-30 RX ORDER — ROSUVASTATIN CALCIUM 5 MG/1
1 TABLET ORAL
Qty: 0 | Refills: 0 | DISCHARGE

## 2021-11-30 RX ORDER — DEXTROSE 50 % IN WATER 50 %
12.5 SYRINGE (ML) INTRAVENOUS ONCE
Refills: 0 | Status: DISCONTINUED | OUTPATIENT
Start: 2021-11-30 | End: 2021-12-01

## 2021-11-30 RX ORDER — SODIUM CHLORIDE 9 MG/ML
500 INJECTION INTRAMUSCULAR; INTRAVENOUS; SUBCUTANEOUS
Refills: 0 | Status: DISCONTINUED | OUTPATIENT
Start: 2021-11-30 | End: 2021-12-01

## 2021-11-30 RX ORDER — METOPROLOL TARTRATE 50 MG
25 TABLET ORAL DAILY
Refills: 0 | Status: DISCONTINUED | OUTPATIENT
Start: 2021-12-01 | End: 2021-12-01

## 2021-11-30 RX ORDER — EMPAGLIFLOZIN 10 MG/1
1 TABLET, FILM COATED ORAL
Qty: 0 | Refills: 0 | DISCHARGE

## 2021-11-30 RX ADMIN — SODIUM CHLORIDE 75 MILLILITER(S): 9 INJECTION INTRAMUSCULAR; INTRAVENOUS; SUBCUTANEOUS at 18:18

## 2021-11-30 RX ADMIN — CLOPIDOGREL BISULFATE 600 MILLIGRAM(S): 75 TABLET, FILM COATED ORAL at 13:11

## 2021-11-30 RX ADMIN — Medication 325 MILLIGRAM(S): at 13:12

## 2021-11-30 RX ADMIN — Medication 0: at 22:25

## 2021-11-30 RX ADMIN — SODIUM CHLORIDE 30 MILLILITER(S): 9 INJECTION INTRAMUSCULAR; INTRAVENOUS; SUBCUTANEOUS at 13:14

## 2021-11-30 NOTE — PATIENT PROFILE ADULT - FALL HARM RISK - HARM RISK INTERVENTIONS

## 2021-12-01 VITALS — TEMPERATURE: 98 F

## 2021-12-01 LAB
ALBUMIN SERPL ELPH-MCNC: 3.7 G/DL — SIGNIFICANT CHANGE UP (ref 3.3–5)
ALP SERPL-CCNC: 54 U/L — SIGNIFICANT CHANGE UP (ref 40–120)
ALT FLD-CCNC: 22 U/L — SIGNIFICANT CHANGE UP (ref 10–45)
ANION GAP SERPL CALC-SCNC: 7 MMOL/L — SIGNIFICANT CHANGE UP (ref 5–17)
AST SERPL-CCNC: 19 U/L — SIGNIFICANT CHANGE UP (ref 10–40)
BASOPHILS # BLD AUTO: 0.05 K/UL — SIGNIFICANT CHANGE UP (ref 0–0.2)
BASOPHILS NFR BLD AUTO: 0.6 % — SIGNIFICANT CHANGE UP (ref 0–2)
BILIRUB SERPL-MCNC: 0.4 MG/DL — SIGNIFICANT CHANGE UP (ref 0.2–1.2)
BUN SERPL-MCNC: 15 MG/DL — SIGNIFICANT CHANGE UP (ref 7–23)
CALCIUM SERPL-MCNC: 9 MG/DL — SIGNIFICANT CHANGE UP (ref 8.4–10.5)
CHLORIDE SERPL-SCNC: 107 MMOL/L — SIGNIFICANT CHANGE UP (ref 96–108)
CO2 SERPL-SCNC: 25 MMOL/L — SIGNIFICANT CHANGE UP (ref 22–31)
CREAT SERPL-MCNC: 0.93 MG/DL — SIGNIFICANT CHANGE UP (ref 0.5–1.3)
EOSINOPHIL # BLD AUTO: 0.27 K/UL — SIGNIFICANT CHANGE UP (ref 0–0.5)
EOSINOPHIL NFR BLD AUTO: 3.5 % — SIGNIFICANT CHANGE UP (ref 0–6)
GLUCOSE BLDC GLUCOMTR-MCNC: 120 MG/DL — HIGH (ref 70–99)
GLUCOSE SERPL-MCNC: 128 MG/DL — HIGH (ref 70–99)
HCT VFR BLD CALC: 42.2 % — SIGNIFICANT CHANGE UP (ref 39–50)
HCV AB S/CO SERPL IA: 0.04 S/CO — SIGNIFICANT CHANGE UP
HCV AB SERPL-IMP: SIGNIFICANT CHANGE UP
HGB BLD-MCNC: 13.5 G/DL — SIGNIFICANT CHANGE UP (ref 13–17)
IMM GRANULOCYTES NFR BLD AUTO: 0.3 % — SIGNIFICANT CHANGE UP (ref 0–1.5)
LYMPHOCYTES # BLD AUTO: 2.38 K/UL — SIGNIFICANT CHANGE UP (ref 1–3.3)
LYMPHOCYTES # BLD AUTO: 30.4 % — SIGNIFICANT CHANGE UP (ref 13–44)
MAGNESIUM SERPL-MCNC: 2.2 MG/DL — SIGNIFICANT CHANGE UP (ref 1.6–2.6)
MCHC RBC-ENTMCNC: 29.9 PG — SIGNIFICANT CHANGE UP (ref 27–34)
MCHC RBC-ENTMCNC: 32 GM/DL — SIGNIFICANT CHANGE UP (ref 32–36)
MCV RBC AUTO: 93.4 FL — SIGNIFICANT CHANGE UP (ref 80–100)
MONOCYTES # BLD AUTO: 0.59 K/UL — SIGNIFICANT CHANGE UP (ref 0–0.9)
MONOCYTES NFR BLD AUTO: 7.5 % — SIGNIFICANT CHANGE UP (ref 2–14)
NEUTROPHILS # BLD AUTO: 4.51 K/UL — SIGNIFICANT CHANGE UP (ref 1.8–7.4)
NEUTROPHILS NFR BLD AUTO: 57.7 % — SIGNIFICANT CHANGE UP (ref 43–77)
NRBC # BLD: 0 /100 WBCS — SIGNIFICANT CHANGE UP (ref 0–0)
PLATELET # BLD AUTO: 220 K/UL — SIGNIFICANT CHANGE UP (ref 150–400)
POTASSIUM SERPL-MCNC: 4.4 MMOL/L — SIGNIFICANT CHANGE UP (ref 3.5–5.3)
POTASSIUM SERPL-SCNC: 4.4 MMOL/L — SIGNIFICANT CHANGE UP (ref 3.5–5.3)
PROT SERPL-MCNC: 6.6 G/DL — SIGNIFICANT CHANGE UP (ref 6–8.3)
RBC # BLD: 4.52 M/UL — SIGNIFICANT CHANGE UP (ref 4.2–5.8)
RBC # FLD: 12.4 % — SIGNIFICANT CHANGE UP (ref 10.3–14.5)
SODIUM SERPL-SCNC: 139 MMOL/L — SIGNIFICANT CHANGE UP (ref 135–145)
WBC # BLD: 7.82 K/UL — SIGNIFICANT CHANGE UP (ref 3.8–10.5)
WBC # FLD AUTO: 7.82 K/UL — SIGNIFICANT CHANGE UP (ref 3.8–10.5)

## 2021-12-01 PROCEDURE — 99239 HOSP IP/OBS DSCHRG MGMT >30: CPT

## 2021-12-01 PROCEDURE — C1760: CPT

## 2021-12-01 PROCEDURE — 80053 COMPREHEN METABOLIC PANEL: CPT

## 2021-12-01 PROCEDURE — 82962 GLUCOSE BLOOD TEST: CPT

## 2021-12-01 PROCEDURE — C1874: CPT

## 2021-12-01 PROCEDURE — 85025 COMPLETE CBC W/AUTO DIFF WBC: CPT

## 2021-12-01 PROCEDURE — 85730 THROMBOPLASTIN TIME PARTIAL: CPT

## 2021-12-01 PROCEDURE — 83036 HEMOGLOBIN GLYCOSYLATED A1C: CPT

## 2021-12-01 PROCEDURE — C1769: CPT

## 2021-12-01 PROCEDURE — 80061 LIPID PANEL: CPT

## 2021-12-01 PROCEDURE — 82550 ASSAY OF CK (CPK): CPT

## 2021-12-01 PROCEDURE — C1725: CPT

## 2021-12-01 PROCEDURE — 85610 PROTHROMBIN TIME: CPT

## 2021-12-01 PROCEDURE — 83735 ASSAY OF MAGNESIUM: CPT

## 2021-12-01 PROCEDURE — 82553 CREATINE MB FRACTION: CPT

## 2021-12-01 PROCEDURE — 36415 COLL VENOUS BLD VENIPUNCTURE: CPT

## 2021-12-01 PROCEDURE — C1894: CPT

## 2021-12-01 PROCEDURE — C1887: CPT

## 2021-12-01 PROCEDURE — 86803 HEPATITIS C AB TEST: CPT

## 2021-12-01 RX ORDER — METOPROLOL TARTRATE 50 MG
1 TABLET ORAL
Qty: 0 | Refills: 0 | DISCHARGE

## 2021-12-01 RX ORDER — METOPROLOL TARTRATE 50 MG
1 TABLET ORAL
Qty: 30 | Refills: 3
Start: 2021-12-01 | End: 2022-03-30

## 2021-12-01 RX ORDER — SACUBITRIL AND VALSARTAN 24; 26 MG/1; MG/1
1 TABLET, FILM COATED ORAL
Qty: 0 | Refills: 0 | DISCHARGE

## 2021-12-01 RX ORDER — CLOPIDOGREL BISULFATE 75 MG/1
1 TABLET, FILM COATED ORAL
Qty: 0 | Refills: 0 | DISCHARGE

## 2021-12-01 RX ORDER — ASPIRIN/CALCIUM CARB/MAGNESIUM 324 MG
1 TABLET ORAL
Qty: 30 | Refills: 11
Start: 2021-12-01 | End: 2022-11-25

## 2021-12-01 RX ORDER — CLOPIDOGREL BISULFATE 75 MG/1
1 TABLET, FILM COATED ORAL
Qty: 30 | Refills: 11
Start: 2021-12-01 | End: 2022-11-25

## 2021-12-01 RX ORDER — ICOSAPENT ETHYL 500 MG/1
130 CAPSULE, LIQUID FILLED ORAL
Qty: 30 | Refills: 3
Start: 2021-12-01 | End: 2022-03-30

## 2021-12-01 RX ORDER — SACUBITRIL AND VALSARTAN 24; 26 MG/1; MG/1
1 TABLET, FILM COATED ORAL
Qty: 60 | Refills: 2
Start: 2021-12-01 | End: 2022-02-28

## 2021-12-01 RX ORDER — ASPIRIN/CALCIUM CARB/MAGNESIUM 324 MG
1 TABLET ORAL
Qty: 0 | Refills: 0 | DISCHARGE

## 2021-12-01 RX ORDER — EZETIMIBE 10 MG/1
1 TABLET ORAL
Qty: 30 | Refills: 3
Start: 2021-12-01 | End: 2022-03-30

## 2021-12-01 NOTE — DISCHARGE NOTE NURSING/CASE MANAGEMENT/SOCIAL WORK - PATIENT PORTAL LINK FT
You can access the FollowMyHealth Patient Portal offered by [facility] by registering at the following website: [email or web address]. By joining [employer]’s FollowMyHealth portal, you will also be able to view your health information using other applications (apps) compatible with our system.

## 2021-12-01 NOTE — DISCHARGE NOTE PROVIDER - NSDCFUADDINST_GEN_ALL_CORE_FT
No heavy lifting, strenuous activity, bending, straining or unnecessary stair climbing  for 2 weeks. No sex for 1 week.  No driving for 2 days. You may shower 24 hours following procedure but avoid baths and swimming for 1 week. Check groin site for bleeding and/or swelling daily following procedure. Call your doctor/cardiologist immediately should it occur or if you have increased/persistent pain at the site. Follow up with your cardiologist in 1- 2 weeks. You may call [facility] Cardiovascular unit at  [phone] after office hours and weekends  with any questions or concerns following your procedure. Take medications as prescribed.

## 2021-12-01 NOTE — DISCHARGE NOTE PROVIDER - NSDCMRMEDTOKEN_GEN_ALL_CORE_FT
Aspirin Enteric Coated 81 mg oral delayed release tablet: 1 tab(s) orally once a day  Entresto 24 mg-26 mg oral tablet: 1 tab(s) orally 2 times a day  glyburide-metformin 5 mg-500 mg oral tablet: 1 tab(s) orally 2 times a day  Jardiance 10 mg oral tablet: 1 tab(s) orally once a day (in the morning)  Plavix 75 mg oral tablet: 1 tab(s) orally once a day  Toprol-XL 25 mg oral tablet, extended release: 1 tab(s) orally once a day  Vascepa 1 g oral capsule: 130 cap(s) orally 2 times a day    Aspirin Enteric Coated 81 mg oral delayed release tablet: 1 tab(s) orally once a day  cardiac rehabilitation: Cardiac Rehabilitation  Dx:  Unstable Angina  3 times weekly for 12 weeks  Entresto 24 mg-26 mg oral tablet: 1 tab(s) orally 2 times a day  glyburide-metformin 5 mg-500 mg oral tablet: 1 tab(s) orally 2 times a day  Jardiance 10 mg oral tablet: 1 tab(s) orally once a day (in the morning)  Plavix 75 mg oral tablet: 1 tab(s) orally once a day  Toprol-XL 25 mg oral tablet, extended release: 1 tab(s) orally once a day  Vascepa 1 g oral capsule: 130 cap(s) orally 2 times a day    Aspirin Enteric Coated 81 mg oral delayed release tablet: 1 tab(s) orally once a day  Entresto 24 mg-26 mg oral tablet: 1 tab(s) orally 2 times a day  glyburide-metformin 5 mg-500 mg oral tablet: 1 tab(s) orally 2 times a day  Jardiance 10 mg oral tablet: 1 tab(s) orally once a day (in the morning)  Plavix 75 mg oral tablet: 1 tab(s) orally once a day  Toprol-XL 25 mg oral tablet, extended release: 1 tab(s) orally once a day  Vascepa 1 g oral capsule: 130 cap(s) orally 2 times a day   Zetia 10 mg oral tablet: 1 tab(s) orally once a day

## 2021-12-01 NOTE — DISCHARGE NOTE NURSING/CASE MANAGEMENT/SOCIAL WORK - NSDCPEFALRISK_GEN_ALL_CORE
For information on Fall & Injury Prevention, visit: [email or web address] OR  [email or web address] OR  https://www.cdc.gov/steadi/patient.html

## 2021-12-01 NOTE — DISCHARGE NOTE PROVIDER - NSDCCPCAREPLAN_GEN_ALL_CORE_FT
PRINCIPAL DISCHARGE DIAGNOSIS  Diagnosis: CAD (coronary artery disease)  Assessment and Plan of Treatment: - You underwent a cardiac catheterization and was found with blockages in your Left Anterior Descending Artery which were opened with placement of one Drug-Eluting Stent. Please take Aspirin 81 mg daily and Plavix 75 mg daily to keep the stent open in your heart.   - NEVER MISS A DOSE OF ASPIRIN OR PLAVIX; IF YOU DO, YOU ARE AT RISK OF YOUR STENT CLOSING AND HAVING A HEART ATTACK. DO NOT STOP THESE TWO MEDICATIONS UNLESS INSTRUCTED TO DO SO BY YOUR CARDIOLOGIST   - Your procedure was done through your groin  - You do not need to keep this area covered and you may shower.   - Please avoid any heavy lifting (no more than 3 to 5 lbs) or strenuous activity for five days. If you develop any swelling, bleeding, hardening of the skin (hematoma formation), acute pain, numbness/tingling  in your arm please contact your doctor immediately or call our 24/7 line: [phone]   - Please follow up with your cardiologist, Dr. Leroy within 1-2 weeks of discharge for a check up on your heart   - We have provided you with a prescription for cardiac rehab which is a medically supervised exercise program for your heart and has been shown to improve the quantity and quality of life of people with heart disease like yours.   - You should attend cardiac rehab 3 times per week for 12 weeks.    - We have provided you with a list of nearby facilities.   - Please call your insurance carrier to determine which of these facilities are covered under your plan.   - Please bring this prescription with you to your follow up appointment with your cardiologist who can then further assist you to enroll into a cardiac rehab program.        SECONDARY DISCHARGE DIAGNOSES  Diagnosis: PVD (peripheral vascular disease)  Assessment and Plan of Treatment: You have a diagnosis of peripheral arterial disease and should continue daily   -You underwent a Diagnostic Peripheral Angiogram (no interventions done) and should continue Aspirin and Plavix as prescribed.  Please make a follow up appointment with your cardiologist within 1-2 weeks of your discharge. All of your prescriptions have been sent electronically to your pharmacy.      Diagnosis: HTN (hypertension)  Assessment and Plan of Treatment: You have a history of elevated blood pressure and you should continue your blood pressure medications as prescribed.  --Continue Metoprolol Succinate 25 mg daily (NO CHANGES)      Diagnosis: HLD (hyperlipidemia)  Assessment and Plan of Treatment: You have an intolerance to Statins in the past.   --Please start Vascepa 2gm twice daily.   --Please follow up with Dr. Leroy for consideration to possible starting Repatha or Praluent.       Diagnosis: DM (diabetes mellitus)  Assessment and Plan of Treatment: If you are a diabetic and you take Metformin: DO NOT TAKE your Glyburide/Metformin for two days. This medication can interact with the contrast used during your procedure therefore we want to ensure the contrast has left your body prior to you restarting your  Glyburide/Metformin. Make sure you monitor your finger sticks and diet while you are not taking your Glyburide/Metformin!  PLEASE HOLD AND RESTART GLYBURIDE/METFORMIN ON FRIDAY, 12/3/21      Diagnosis: Chronic systolic heart failure  Assessment and Plan of Treatment: -Heart failure is a condition that occurs when the heart cannot pump blood as well as it should; this leads to inadequate blood flow to vital organs such as the kidneys and congestion (buildup of fluid) in other vital organs such as the lungs   -You have a history of weakened heart muscle called systolic congestive heart failure. When the heart pumps, it doesn't squeeze normally.   -Please take Entresto 24/26mg twice daily.   It is extremely important you adhere to your medication regimen.   -Please make sure you follow up with your cardiologist Dr. Leroy.    Please weigh yourself daily: if you have gained more than 2-3 lbs in one day or 5 lbs in one week contact your doctor immediately as you may be retaining water weight   -In addition, restrict your salt intake to less than 2 grams a day   -If you develop worsening shortening of breath, leg swelling, fatigue, chest pain, difficulty sleeping at night due to shortness of breath, contact your cardiologist immediately.       PRINCIPAL DISCHARGE DIAGNOSIS  Diagnosis: CAD (coronary artery disease)  Assessment and Plan of Treatment: - You underwent a cardiac catheterization and was found with blockages in your Left Anterior Descending Artery which were opened with placement of one Drug-Eluting Stent. Please take Aspirin 81 mg daily and Plavix 75 mg daily to keep the stent open in your heart.   - NEVER MISS A DOSE OF ASPIRIN OR PLAVIX; IF YOU DO, YOU ARE AT RISK OF YOUR STENT CLOSING AND HAVING A HEART ATTACK. DO NOT STOP THESE TWO MEDICATIONS UNLESS INSTRUCTED TO DO SO BY YOUR CARDIOLOGIST   - Your procedure was done through your groin  - You do not need to keep this area covered and you may shower.   - Please avoid any heavy lifting (no more than 3 to 5 lbs) or strenuous activity for five days. If you develop any swelling, bleeding, hardening of the skin (hematoma formation), acute pain, numbness/tingling  in your arm please contact your doctor immediately or call our 24/7 line: [phone]   - Please follow up with your cardiologist, Dr. Leroy within 1-2 weeks of discharge for a check up on your heart   - We have provided you with a prescription for cardiac rehab which is a medically supervised exercise program for your heart and has been shown to improve the quantity and quality of life of people with heart disease like yours.   - You should attend cardiac rehab 3 times per week for 12 weeks.    - We have provided you with a list of nearby facilities.   - Please call your insurance carrier to determine which of these facilities are covered under your plan.   - Please bring this prescription with you to your follow up appointment with your cardiologist who can then further assist you to enroll into a cardiac rehab program.        SECONDARY DISCHARGE DIAGNOSES  Diagnosis: PVD (peripheral vascular disease)  Assessment and Plan of Treatment: You have a diagnosis of peripheral arterial disease and should continue daily   -You underwent a Diagnostic Peripheral Angiogram (no interventions done) and should continue Aspirin and Plavix as prescribed.  Please make a follow up appointment with your cardiologist within 1-2 weeks of your discharge. All of your prescriptions have been sent electronically to your pharmacy.      Diagnosis: HTN (hypertension)  Assessment and Plan of Treatment: You have a history of elevated blood pressure and you should continue your blood pressure medications as prescribed.  --Continue Metoprolol Succinate 25 mg daily (NO CHANGES)      Diagnosis: HLD (hyperlipidemia)  Assessment and Plan of Treatment: You have an intolerance to Statins in the past.   --Please start Vascepa 2gm twice daily.   --Please start Zetia 10mg daily  --Please follow up with Dr. Leroy for consideration to possible starting Repatha or Praluent.       Diagnosis: DM (diabetes mellitus)  Assessment and Plan of Treatment: If you are a diabetic and you take Metformin: DO NOT TAKE your Glyburide/Metformin for two days. This medication can interact with the contrast used during your procedure therefore we want to ensure the contrast has left your body prior to you restarting your  Glyburide/Metformin. Make sure you monitor your finger sticks and diet while you are not taking your Glyburide/Metformin!  PLEASE HOLD AND RESTART GLYBURIDE/METFORMIN ON FRIDAY, 12/3/21      Diagnosis: Chronic systolic heart failure  Assessment and Plan of Treatment: -Heart failure is a condition that occurs when the heart cannot pump blood as well as it should; this leads to inadequate blood flow to vital organs such as the kidneys and congestion (buildup of fluid) in other vital organs such as the lungs   -You have a history of weakened heart muscle called systolic congestive heart failure. When the heart pumps, it doesn't squeeze normally.   -Please take Entresto 24/26mg twice daily.   It is extremely important you adhere to your medication regimen.   -Please make sure you follow up with your cardiologist Dr. Leroy.    Please weigh yourself daily: if you have gained more than 2-3 lbs in one day or 5 lbs in one week contact your doctor immediately as you may be retaining water weight   -In addition, restrict your salt intake to less than 2 grams a day   -If you develop worsening shortening of breath, leg swelling, fatigue, chest pain, difficulty sleeping at night due to shortness of breath, contact your cardiologist immediately.

## 2021-12-01 NOTE — DISCHARGE NOTE PROVIDER - HOSPITAL COURSE
61Y M w/ FHx of CAD and PMHx of HTN, HLD, CAD w/ prior MI, s/p CABG (2004), DM-II, and CHF (EF 25% per pt) who initially presented to outpt cardiologist Dr. Coleman c/o intermittent palpitations since April, independent of exertion and resolves on its own. Pt endorses occasional dizziness but denies any lightheadedness or syncope. Additonally endorses LLE pain on ambulation for which he saw vascular Dr. Lentz and tentatively planned for peripheral angiogram with cardiac cath. He also denies any CP, LE edema, GUSMAN, orthopnea, PND, N/V, fever, chills or recent sick contact. Per MD note, Echo revealed no LV thrombus and Holter Monitor revealed no evidence of malignant tachy or riana arrythmia's.  In light of pts risk factors, CCS class III anginal symptoms and newly reduced LVEF, pt now presents to [facility] for recommended cardiac catheterization with possible intervention if clinically indicated to r/o ICMP.       Pt is s/p cardiac catheterization (11/30/2021): MILADIS mLAD (distal to anastomosis).  EF 30% by cardiac cath, EDP 7 mmHG.   Mild diffuse disease, pLAD 100% stenosis, pLCx 70% stenosis, severe 90% OM1 stenosis, LPDA 100% stenosis, RCA non dominant 100% (small). Impression: LIMA-LAD patent, mLAD 80-90% stenosis dsital to anastomosis, SVG-LPDA, SVG-D1 patent.  RCFA Accessed.     s/p Diagnostic Peripheral angiogram (11/30/2021): B/l SONJA normal, B/l SFA mild diffuse disease, b/l popliteal mild, left AT and TP trunk mild, Left %, Right % and Right %, Right peroneal mild disease,  Total contrast: 190 mLs. CR normal. EF 30% by Cath. Intraprocedure: SBP 80-90s s/p Dorian 100 mcg x one dose. SBP 88-95 mmHg. Asymptomatic.  NS @ 75 cc/hour x six hours per Dr. Leroy (access site stable).   DAPT: ASA/Plavix.  Of note:  Pt with Statin Intolerance, reports severe myalgias to Crestor and Lipitor. LDL: 168. Reports he has Crestor at home but has not taken it due to severe leg cramps. Prevention team consulted.  Per Mariaa discharge on Vascepa.     Pt seen at bedside today, examined found NAD, HD stable, denies any complaints of CP, SOB or palpitations at this time.  VSS.  EKG showed no acute ischemic events.  Tele Monitor/Labs reviewed.  Right Groin access noted with slight ooze, pressure held, redressed  now stable w/o hematoma or bleed.  Right pulse stable, 2+, back to baseline.   Pt is to continue ASA/Plavix and Vascepa as prescribed.   Pt is deemed stable for discharge per Dr Castañeda with follow up in 1-2 weeks with Dr. Leroy for post discharge check-up at which time consideration to be made to possible start a PCSK9 inhibitor.  Rx sent to pt preferred pharmacy.  Discharge plans and instruction discussed with pt who is agreeable with plan.  Pt is advised to return to the nearest ED if worsening symptoms of CP, SOB or palpitations or severe bleeding from access site occurs.    Dx: CHF,Unstable Angina (s/p PCI)  Rx for ASA/Plavix and Atorvastatin and given at discharge: Yes  Education on benefits of Cardiac Rehab provided to patient: Yes  Prescription printed and given for Cardiac Rehab to patient: Yes  Patient given list of locations & instructed to contact their insurance company to review list of participating providers: Yes           61Y M w/ FHx of CAD and PMHx of HTN, HLD, CAD w/ prior MI, s/p CABG (2004), DM-II, and CHF (EF 25% per pt) who initially presented to outpt cardiologist Dr. Coleman c/o intermittent palpitations since April, independent of exertion and resolves on its own. Pt endorses occasional dizziness but denies any lightheadedness or syncope. Additonally endorses LLE pain on ambulation for which he saw vascular Dr. Lentz and tentatively planned for peripheral angiogram with cardiac cath. He also denies any CP, LE edema, GUSMAN, orthopnea, PND, N/V, fever, chills or recent sick contact. Per MD note, Echo revealed no LV thrombus and Holter Monitor revealed no evidence of malignant tachy or riana arrythmia's.  In light of pts risk factors, CCS class III anginal symptoms and newly reduced LVEF, pt now presents to [facility] for recommended cardiac catheterization with possible intervention if clinically indicated to r/o ICMP.       Pt is s/p cardiac catheterization (11/30/2021): MILADIS mLAD (distal to anastomosis).  EF 30% by cardiac cath, EDP 7 mmHG.   Mild diffuse disease, pLAD 100% stenosis, pLCx 70% stenosis, severe 90% OM1 stenosis, LPDA 100% stenosis, RCA non dominant 100% (small). Impression: LIMA-LAD patent, mLAD 80-90% stenosis dsital to anastomosis, SVG-LPDA, SVG-D1 patent.  RCFA Accessed.     Pt is s/p Diagnostic Peripheral angiogram (11/30/2021): B/l SONJA normal, B/l SFA mild diffuse disease, b/l popliteal mild, left AT and TP trunk mild, Left %, Right % and Right %, Right peroneal mild disease,  Total contrast: 190 mLs. CR normal. EF 30% by Cath. Intraprocedure: SBP 80-90s s/p Dorian 100 mcg x one dose. SBP 88-95 mmHg. Asymptomatic.  NS @ 75 cc/hour x six hours per Dr. Leroy (access site stable).   DAPT: ASA/Plavix.  Of note:  Pt with Statin Intolerance, reports severe myalgias to Crestor and Lipitor. LDL: 168. Reports he has Crestor at home but has not taken it due to severe leg cramps. Prevention team consulted.  Per Mariaa discharge on Vascepa.     Pt seen at bedside today, examined found NAD, HD stable, denies any complaints of CP, SOB or palpitations at this time.  VSS.  EKG showed no acute ischemic events.  Tele Monitor/Labs reviewed.  Right Groin access noted with slight ooze, pressure held, redressed  now stable w/o hematoma or bleed.  Right pulse stable, back to baseline.   Pt is to continue ASA/Plavix .  He is prescribed Vascepa and Zetia due to his Statin intolerance.  Pt is to recommended to hold Gylburide/Metformin for 2 days and restart on 12/3/21.  He is to continue all his other home medications prescribed.  Pt is deemed stable for discharge per Dr Castañeda with follow up in 1-2 weeks with Dr. Leroy for post discharge check-up at which time consideration to be made to possible start a PCSK9 inhibitor.  Rx sent to pt preferred pharmacy.  Discharge plans and instruction discussed with pt who is agreeable with plan.  Pt is advised to return to the nearest ED if worsening symptoms of CP, SOB or palpitations or severe bleeding from access site occurs.    Dx: CHF,Unstable Angina (s/p PCI)  Rx for ASA/Plavix and Atorvastatin and given at discharge: Yes  Education on benefits of Cardiac Rehab provided to patient: Yes  Prescription printed and given for Cardiac Rehab to patient: Yes  Patient given list of locations & instructed to contact their insurance company to review list of participating providers: Yes

## 2021-12-01 NOTE — DISCHARGE NOTE PROVIDER - CARE PROVIDER_API CALL
Richard Leroy)  Cardiology; Internal Medicine  [address] [place] [address]  Phone: [phone]  Fax: [phone]  Follow Up Time:

## 2021-12-13 DIAGNOSIS — I25.82 CHRONIC TOTAL OCCLUSION OF CORONARY ARTERY: ICD-10-CM

## 2021-12-13 DIAGNOSIS — I11.0 HYPERTENSIVE HEART DISEASE WITH HEART FAILURE: ICD-10-CM

## 2021-12-13 DIAGNOSIS — I25.2 OLD MYOCARDIAL INFARCTION: ICD-10-CM

## 2021-12-13 DIAGNOSIS — I50.22 CHRONIC SYSTOLIC (CONGESTIVE) HEART FAILURE: ICD-10-CM

## 2021-12-13 DIAGNOSIS — Z79.82 LONG TERM (CURRENT) USE OF ASPIRIN: ICD-10-CM

## 2021-12-13 DIAGNOSIS — Z95.1 PRESENCE OF AORTOCORONARY BYPASS GRAFT: ICD-10-CM

## 2021-12-13 DIAGNOSIS — E11.51 TYPE 2 DIABETES MELLITUS WITH DIABETIC PERIPHERAL ANGIOPATHY WITHOUT GANGRENE: ICD-10-CM

## 2021-12-13 DIAGNOSIS — E78.5 HYPERLIPIDEMIA, UNSPECIFIED: ICD-10-CM

## 2021-12-13 DIAGNOSIS — R00.2 PALPITATIONS: ICD-10-CM

## 2021-12-13 DIAGNOSIS — I25.110 ATHEROSCLEROTIC HEART DISEASE OF NATIVE CORONARY ARTERY WITH UNSTABLE ANGINA PECTORIS: ICD-10-CM

## 2022-04-28 NOTE — ED PROVIDER NOTE - MUSCULOSKELETAL, MLM
Addended by: BRIDGETT GONZALES on: 4/28/2022 11:32 AM     Modules accepted: Orders     Spine appears normal, range of motion is not limited, no muscle or joint tenderness. 5/5 b/l UE and LE.

## 2023-09-06 ENCOUNTER — OUTPATIENT (OUTPATIENT)
Dept: OUTPATIENT SERVICES | Facility: HOSPITAL | Age: 63
LOS: 1 days | End: 2023-09-06
Payer: COMMERCIAL

## 2023-09-06 VITALS
DIASTOLIC BLOOD PRESSURE: 77 MMHG | TEMPERATURE: 98 F | RESPIRATION RATE: 14 BRPM | WEIGHT: 139.99 LBS | HEART RATE: 65 BPM | SYSTOLIC BLOOD PRESSURE: 140 MMHG | HEIGHT: 66 IN | OXYGEN SATURATION: 100 %

## 2023-09-06 VITALS
OXYGEN SATURATION: 99 % | DIASTOLIC BLOOD PRESSURE: 80 MMHG | SYSTOLIC BLOOD PRESSURE: 120 MMHG | HEART RATE: 93 BPM | RESPIRATION RATE: 18 BRPM

## 2023-09-06 DIAGNOSIS — Z12.11 ENCOUNTER FOR SCREENING FOR MALIGNANT NEOPLASM OF COLON: ICD-10-CM

## 2023-09-06 DIAGNOSIS — Z95.5 PRESENCE OF CORONARY ANGIOPLASTY IMPLANT AND GRAFT: Chronic | ICD-10-CM

## 2023-09-06 DIAGNOSIS — Z95.1 PRESENCE OF AORTOCORONARY BYPASS GRAFT: Chronic | ICD-10-CM

## 2023-09-06 PROBLEM — E11.9 TYPE 2 DIABETES MELLITUS WITHOUT COMPLICATIONS: Chronic | Status: ACTIVE | Noted: 2021-11-29

## 2023-09-06 PROBLEM — E78.5 HYPERLIPIDEMIA, UNSPECIFIED: Chronic | Status: ACTIVE | Noted: 2021-11-23

## 2023-09-06 PROBLEM — I25.10 ATHEROSCLEROTIC HEART DISEASE OF NATIVE CORONARY ARTERY WITHOUT ANGINA PECTORIS: Chronic | Status: ACTIVE | Noted: 2021-11-29

## 2023-09-06 LAB
ACETONE SERPL-MCNC: NEGATIVE — SIGNIFICANT CHANGE UP
ALBUMIN SERPL ELPH-MCNC: 3.6 G/DL — SIGNIFICANT CHANGE UP (ref 3.5–5)
ALP SERPL-CCNC: 70 U/L — SIGNIFICANT CHANGE UP (ref 40–120)
ALT FLD-CCNC: 30 U/L DA — SIGNIFICANT CHANGE UP (ref 10–60)
ANION GAP SERPL CALC-SCNC: 8 MMOL/L — SIGNIFICANT CHANGE UP (ref 5–17)
AST SERPL-CCNC: 22 U/L — SIGNIFICANT CHANGE UP (ref 10–40)
BILIRUB SERPL-MCNC: 0.9 MG/DL — SIGNIFICANT CHANGE UP (ref 0.2–1.2)
BUN SERPL-MCNC: 12 MG/DL — SIGNIFICANT CHANGE UP (ref 7–18)
CALCIUM SERPL-MCNC: 9 MG/DL — SIGNIFICANT CHANGE UP (ref 8.4–10.5)
CHLORIDE SERPL-SCNC: 105 MMOL/L — SIGNIFICANT CHANGE UP (ref 96–108)
CO2 SERPL-SCNC: 26 MMOL/L — SIGNIFICANT CHANGE UP (ref 22–31)
CREAT SERPL-MCNC: 0.81 MG/DL — SIGNIFICANT CHANGE UP (ref 0.5–1.3)
EGFR: 99 ML/MIN/1.73M2 — SIGNIFICANT CHANGE UP
GLUCOSE BLDC GLUCOMTR-MCNC: 132 MG/DL — HIGH (ref 70–99)
GLUCOSE SERPL-MCNC: 147 MG/DL — HIGH (ref 70–99)
POTASSIUM SERPL-MCNC: 3.6 MMOL/L — SIGNIFICANT CHANGE UP (ref 3.5–5.3)
POTASSIUM SERPL-SCNC: 3.6 MMOL/L — SIGNIFICANT CHANGE UP (ref 3.5–5.3)
PROT SERPL-MCNC: 7.6 G/DL — SIGNIFICANT CHANGE UP (ref 6–8.3)
SODIUM SERPL-SCNC: 139 MMOL/L — SIGNIFICANT CHANGE UP (ref 135–145)

## 2023-09-06 PROCEDURE — 45380 COLONOSCOPY AND BIOPSY: CPT | Mod: PT

## 2023-09-06 PROCEDURE — 36415 COLL VENOUS BLD VENIPUNCTURE: CPT

## 2023-09-06 PROCEDURE — 88305 TISSUE EXAM BY PATHOLOGIST: CPT | Mod: 26

## 2023-09-06 PROCEDURE — 88305 TISSUE EXAM BY PATHOLOGIST: CPT

## 2023-09-06 PROCEDURE — 80053 COMPREHEN METABOLIC PANEL: CPT

## 2023-09-06 PROCEDURE — 82009 KETONE BODYS QUAL: CPT

## 2023-09-06 PROCEDURE — 82962 GLUCOSE BLOOD TEST: CPT

## 2023-09-06 RX ORDER — SODIUM CHLORIDE 9 MG/ML
500 INJECTION INTRAMUSCULAR; INTRAVENOUS; SUBCUTANEOUS
Refills: 0 | Status: DISCONTINUED | OUTPATIENT
Start: 2023-09-06 | End: 2023-09-20

## 2023-09-06 NOTE — ASU PATIENT PROFILE, ADULT - NSICDXPASTSURGICALHX_GEN_ALL_CORE_FT
PAST SURGICAL HISTORY:  Coronary stent patent 2021/2022    S/P CABG (coronary artery bypass graft) 2004

## 2023-09-06 NOTE — ASU PATIENT PROFILE, ADULT - NSICDXPASTMEDICALHX_GEN_ALL_CORE_FT
PAST MEDICAL HISTORY:  Acute on chronic systolic congestive heart failure     CAD (coronary artery disease)     DM (diabetes mellitus)     H/O: HTN (hypertension)     Hyperlipidemia

## 2023-09-08 LAB — SURGICAL PATHOLOGY STUDY: SIGNIFICANT CHANGE UP

## 2023-09-19 NOTE — PATIENT PROFILE ADULT - FUNCTIONAL ASSESSMENT - BASIC MOBILITY 4.
Returned call to ms. Tommy Michaels. Last depo was 8/3/2023. Next due 10/19-11-02.  Appointment already scheduled for depo 10/26/2023 with China Carl 4 = No assist / stand by assistance

## 2024-01-03 NOTE — PATIENT PROFILE ADULT - FALL HARM RISK - TYPE OF ASSESSMENT
[Patient preference] : as per patient preference [Telehealth (audio & video) - Individual/Group] : This visit was provided via telehealth using real-time 2-way audio visual technology. [Medical Office: ([facility])___] : The provider was located at the medical office in [unfilled]. [Home] : The patient, [unfilled], was located at home, [unfilled], at the time of the visit. [FreeTextEntry4] : 0757 [FreeTextEntry] : 0262 [Patient] : Patient [FreeTextEntry1] : PTSD  Admission

## 2024-07-22 NOTE — DISCHARGE NOTE NURSING/CASE MANAGEMENT/SOCIAL WORK - NSPROEXTENSIONSOFSELF_GEN_A_NUR
----- Message from Ancelmo Llanos sent at 7/22/2024  8:20 AM CDT -----  Contact: pt  Type:  Same Day Appointment Request    Caller is requesting a same day appointment.  Caller declined first available appointment listed below.    Name of Caller:pt  When is the first available appointment? N/a in epic  Symptoms:complications from surgery   Best Call Back Number: [phone]  Additional Information:   none

## (undated) DEVICE — Device

## (undated) DEVICE — CONTAINER FORMALIN 10% 20ML

## (undated) DEVICE — FORCEP RADIAL JAW 4 JUMBO NO NDL DISP

## (undated) DEVICE — BITE BLOCK ADULT 20 X 27MM (GREEN)

## (undated) DEVICE — TUBING CANNULA SALTER LABS NASAL ADULT 7FT

## (undated) DEVICE — VENODYNE/SCD SLEEVE CALF MEDIUM

## (undated) DEVICE — KIT ENDO PROCEDURE CUST W/VLV

## (undated) DEVICE — SOLIDIFIER ISOLYZER 2000 CC

## (undated) DEVICE — SOL INJ NS 0.9% 500ML 1-PORT